# Patient Record
Sex: FEMALE | Race: WHITE | NOT HISPANIC OR LATINO | Employment: UNEMPLOYED | URBAN - NONMETROPOLITAN AREA
[De-identification: names, ages, dates, MRNs, and addresses within clinical notes are randomized per-mention and may not be internally consistent; named-entity substitution may affect disease eponyms.]

---

## 2018-03-29 ENCOUNTER — LAB (OUTPATIENT)
Dept: LAB | Facility: HOSPITAL | Age: 23
End: 2018-03-29
Attending: OBSTETRICS & GYNECOLOGY

## 2018-03-29 ENCOUNTER — TRANSCRIBE ORDERS (OUTPATIENT)
Dept: ADMINISTRATIVE | Facility: HOSPITAL | Age: 23
End: 2018-03-29

## 2018-03-29 DIAGNOSIS — N91.2 ABSENCE OF MENSTRUATION: ICD-10-CM

## 2018-03-29 DIAGNOSIS — N91.2 ABSENCE OF MENSTRUATION: Primary | ICD-10-CM

## 2018-03-29 LAB — HCG INTACT+B SERPL-ACNC: <2 MIU/ML (ref 0–5)

## 2018-03-29 PROCEDURE — 84702 CHORIONIC GONADOTROPIN TEST: CPT

## 2018-03-29 PROCEDURE — 36415 COLL VENOUS BLD VENIPUNCTURE: CPT

## 2024-07-01 ENCOUNTER — OFFICE VISIT (OUTPATIENT)
Dept: FAMILY MEDICINE CLINIC | Facility: CLINIC | Age: 29
End: 2024-07-01
Payer: COMMERCIAL

## 2024-07-01 VITALS
HEIGHT: 69 IN | TEMPERATURE: 97.3 F | BODY MASS INDEX: 30.96 KG/M2 | RESPIRATION RATE: 18 BRPM | DIASTOLIC BLOOD PRESSURE: 70 MMHG | OXYGEN SATURATION: 98 % | HEART RATE: 80 BPM | WEIGHT: 209 LBS | SYSTOLIC BLOOD PRESSURE: 136 MMHG

## 2024-07-01 DIAGNOSIS — M79.672 LEFT FOOT PAIN: ICD-10-CM

## 2024-07-01 DIAGNOSIS — N91.2 AMENORRHEA: ICD-10-CM

## 2024-07-01 DIAGNOSIS — R07.89 ANTERIOR CHEST WALL PAIN: Primary | ICD-10-CM

## 2024-07-01 LAB
B-HCG UR QL: NEGATIVE
EXPIRATION DATE: NORMAL
INTERNAL NEGATIVE CONTROL: NEGATIVE
INTERNAL POSITIVE CONTROL: POSITIVE
Lab: NORMAL

## 2024-07-01 PROCEDURE — 81025 URINE PREGNANCY TEST: CPT | Performed by: PSYCHOLOGIST

## 2024-07-01 PROCEDURE — 1160F RVW MEDS BY RX/DR IN RCRD: CPT | Performed by: PSYCHOLOGIST

## 2024-07-01 PROCEDURE — 99204 OFFICE O/P NEW MOD 45 MIN: CPT | Performed by: PSYCHOLOGIST

## 2024-07-01 PROCEDURE — 1159F MED LIST DOCD IN RCRD: CPT | Performed by: PSYCHOLOGIST

## 2024-07-01 RX ORDER — DICLOFENAC SODIUM 75 MG/1
75 TABLET, DELAYED RELEASE ORAL 2 TIMES DAILY WITH MEALS
Qty: 60 TABLET | Refills: 2 | Status: SHIPPED | OUTPATIENT
Start: 2024-07-01 | End: 2024-09-29

## 2024-07-01 NOTE — LETTER
July 1, 2024     Patient: Francheska Gold   YOB: 1995   Date of Visit: 7/1/2024       To Whom It May Concern:    It is my medical opinion that Francheska Gold may return to work on 7/3/24 .           Sincerely,        This document has been electronically signed by Miguel Pereira MD  July 1, 2024 17:33 EDT

## 2024-07-01 NOTE — LETTER
July 3, 2024     Patient: Francheska Gold   YOB: 1995   Date of Visit: 7/1/2024       To Whom It May Concern:    It is my medical opinion that Francheska Gold may return to light duty immediately with the following restrictions: no lifting, tugging, pushing more than 10 lbs.  . She is here today   to  this excuse.           Sincerely,        This document has been electronically signed by Miguel Pereira MD  July 3, 2024 09:38 EDT

## 2024-07-01 NOTE — PROGRESS NOTES
"Chief Complaint  Follow-up (Sternum pain x 1 week/Bilateral burning of the feet)    Patient is here for an urgent care/acute visit.  Patient has an established, non-[facility] Primary Care Provider.       Subjective          Francheska Gold presents to [facility]  As an urgent care visit / may want to establish care:    Chest Pain   This is a new problem. The current episode started 1 to 4 weeks ago. The problem occurs intermittently. The problem has been gradually worsening. The pain is present in the substernal region. The pain is at a severity of 7/10. The pain is moderate. The pain does not radiate. Pertinent negatives include no hemoptysis, irregular heartbeat, leg pain, numbness, palpitations, PND or shortness of breath.   Foot Injury   The incident occurred 5 to 7 days ago. The incident occurred at work. The injury mechanism is unknown. The pain is present in the right foot and left foot. The pain is at a severity of 5/10. The pain has been Fluctuating since onset. Associated symptoms include an inability to bear weight. Pertinent negatives include no loss of motion, loss of sensation, muscle weakness, numbness or tingling. The symptoms are aggravated by weight bearing and movement.     She reports hearing he \"sternum\" crack 3 times at work. She is lifting patients/ doing transfers/ and has been having  Pain in her chest wall.  Her left and right foot hurts too/ left more swollen than the right. Denies any fall.  Objective   Vital Signs:  /70 (BP Location: Right arm, Patient Position: Sitting, Cuff Size: Adult)   Pulse 80   Temp 97.3 °F (36.3 °C) (Temporal)   Resp 18   Ht 175.3 cm (69\")   Wt 94.8 kg (209 lb)   SpO2 98%   BMI 30.86 kg/m²     BMI: Body mass index is 30.86 kg/m².        Physical Exam  Vitals and nursing note reviewed.   Constitutional:       Appearance: Normal appearance. She is obese.   HENT:      Head: Normocephalic.      Right Ear: Tympanic membrane " normal.      Left Ear: Tympanic membrane normal.      Nose: Nose normal.      Mouth/Throat:      Mouth: Mucous membranes are moist.   Eyes:      Extraocular Movements: Extraocular movements intact.      Pupils: Pupils are equal, round, and reactive to light.   Cardiovascular:      Rate and Rhythm: Normal rate and regular rhythm.      Pulses: Normal pulses.      Heart sounds: Normal heart sounds.   Pulmonary:      Effort: Pulmonary effort is normal.      Breath sounds: Normal breath sounds.   Abdominal:      General: Abdomen is protuberant. Bowel sounds are normal.      Palpations: Abdomen is soft.   Musculoskeletal:         General: Normal range of motion.      Cervical back: Normal range of motion and neck supple.   Skin:     General: Skin is warm.      Capillary Refill: Capillary refill takes less than 2 seconds.   Neurological:      General: No focal deficit present.      Mental Status: She is alert and oriented to person, place, and time.   Psychiatric:         Mood and Affect: Mood normal.          Result Review :   The following data was reviewed by: Miguel Pereira MD on 07/01/2024:  NONE REVIEWED      CXR/LEFT FOOT PAIN  X-ray was performed this visit.  Indication: CHEST WALL PAIN / LEFT FOOT PAIN   Interpretation/Findings: NO ACUTE BONY ABNORMALITY   No comparison or previous X-ray was looked at today.         Assessment and Plan    Diagnoses and all orders for this visit:    1. Anterior chest wall pain (Primary)  -     XR Chest PA & Lateral    2. Left foot pain  Comments:  WEAR FOOT BRACE DURING THE DAY  Orders:  -     XR Foot 3+ View Left (In Office)    3. Amenorrhea  Comments:  She reports maybe pregnant / will need to chec pregancy test prior to XR  Orders:  -     POC Pregnancy, Urine    Other orders  -     diclofenac (VOLTAREN) 75 MG EC tablet; Take 1 tablet by mouth 2 (Two) Times a Day With Meals for 90 days.  Dispense: 60 tablet; Refill: 2         I spent 30 minutes caring for Francheska on this  date of service. This time includes time spent by me in the following activities:reviewing tests, obtaining and/or reviewing a separately obtained history, performing a medically appropriate examination and/or evaluation , counseling and educating the patient/family/caregiver, ordering medications, tests, or procedures, and documenting information in the medical record      Follow Up    Return if symptoms worsen or fail to improve/rtc./er.    Patient was given instructions and counseling regarding her condition or for health maintenance advice. Please see specific information pulled into the AVS if appropriate.           This document has been electronically signed by Miguel Pereira MD  July 3, 2024 09:38 EDT

## 2024-07-03 ENCOUNTER — PATIENT ROUNDING (BHMG ONLY) (OUTPATIENT)
Dept: FAMILY MEDICINE CLINIC | Facility: CLINIC | Age: 29
End: 2024-07-03
Payer: COMMERCIAL

## 2024-07-03 NOTE — PROGRESS NOTES
"July 3, 2024    Hello, may I speak with Francheska Gold?    My name is Hector Bustos    I am  with [facility]  [address]  [facility] [address] [phone].    Before we get started may I verify your date of birth? 1995    I am calling to officially welcome you to our practice and ask about your recent visit. Is this a good time to talk?     yes    Tell me about your visit with us. What things went well?      Patient stopped in today 7/3/24 and ask to speak with me regarding her visit with Dr. Pereira. Patient was very upset and started to cry while she was explaining her concerns from her visit on 7/1/24 with Dr. Pereira. She said that she told the person who was scheduling her appointment that she wanted to see Dr. Patrick. She was placed on Dr. Pereira's schedule by mistake. She said when Dr. Pereira started her visit she felt very rushed. She said Dr. Pereira said, \"all my staff is leaving\" and this upset the patient, The patient said she is having a tremendous amount of pain in her ankles. She said she felt like her concerns were not taken serious and she was told there was nothing wrong.She reproted the bedside manner was terrible.  I let the patient know I would discuss her concerns with Dr. Pereira. Patient will be seeing Dr. Patrick moving forward.           Thank you, and have a great day.      "

## 2024-07-09 ENCOUNTER — OFFICE VISIT (OUTPATIENT)
Dept: FAMILY MEDICINE CLINIC | Facility: CLINIC | Age: 29
End: 2024-07-09
Payer: COMMERCIAL

## 2024-07-09 VITALS
OXYGEN SATURATION: 97 % | RESPIRATION RATE: 18 BRPM | BODY MASS INDEX: 31.19 KG/M2 | WEIGHT: 210.6 LBS | TEMPERATURE: 96.8 F | HEART RATE: 78 BPM | DIASTOLIC BLOOD PRESSURE: 65 MMHG | SYSTOLIC BLOOD PRESSURE: 118 MMHG | HEIGHT: 69 IN

## 2024-07-09 DIAGNOSIS — M79.672 LEFT FOOT PAIN: Primary | ICD-10-CM

## 2024-07-09 PROCEDURE — 99213 OFFICE O/P EST LOW 20 MIN: CPT | Performed by: FAMILY MEDICINE

## 2024-07-09 NOTE — PROGRESS NOTES
"Chief Complaint  Follow-up (Bilateral ankle swelling/Chest pain)    Subjective        Francheska Gold presents to [facility]  History of Present Illness  The patient is a 28 y.o. female who is here today for follow up on her left foot pain. The patient states that the pain is still there and today her pain is still 7/10. The patient is also here because her work does not have light duty so she is not able to go to work.      Objective   Vital Signs:  /65 (BP Location: Left arm, Patient Position: Sitting, Cuff Size: Adult)   Pulse 78   Temp 96.8 °F (36 °C) (Temporal)   Resp 18   Ht 175.3 cm (69\")   Wt 95.5 kg (210 lb 9.6 oz)   SpO2 97%   BMI 31.10 kg/m²   Estimated body mass index is 31.1 kg/m² as calculated from the following:    Height as of this encounter: 175.3 cm (69\").    Weight as of this encounter: 95.5 kg (210 lb 9.6 oz).               Physical Exam  Vitals and nursing note reviewed.   Constitutional:       Appearance: Normal appearance.   HENT:      Head: Normocephalic and atraumatic.      Right Ear: Tympanic membrane and ear canal normal.      Left Ear: Tympanic membrane and ear canal normal.      Nose: Nose normal.      Mouth/Throat:      Mouth: Mucous membranes are moist.   Eyes:      Extraocular Movements: Extraocular movements intact.      Pupils: Pupils are equal, round, and reactive to light.   Cardiovascular:      Rate and Rhythm: Normal rate and regular rhythm. No extrasystoles are present.     Heart sounds: Normal heart sounds. No murmur heard.  Pulmonary:      Effort: Pulmonary effort is normal.      Breath sounds: Normal breath sounds. No decreased breath sounds, wheezing or rhonchi.   Abdominal:      Palpations: Abdomen is soft. There is no mass.      Tenderness: There is no abdominal tenderness. There is no right CVA tenderness, left CVA tenderness, guarding or rebound.   Musculoskeletal:         General: Normal range of motion.      Cervical back: " Normal range of motion and neck supple.      Right lower leg: No edema.      Left lower leg: No edema.   Feet:      Comments: Left foot: no bruise or swelling, still with tenderness on the lateral side of the foot all the way to the front of the lateral malleolus  Skin:     Findings: No rash.   Neurological:      General: No focal deficit present.      Mental Status: She is alert and oriented to person, place, and time.      Sensory: Sensation is intact.      Motor: Motor function is intact.      Coordination: Coordination is intact.      Gait: Gait is intact.      Deep Tendon Reflexes: Reflexes are normal and symmetric.   Psychiatric:         Attention and Perception: Attention and perception normal.         Mood and Affect: Mood normal.         Speech: Speech normal.         Behavior: Behavior normal. Behavior is cooperative.         Thought Content: Thought content normal.        Result Review :                     Assessment and Plan     Diagnoses and all orders for this visit:    1. Left foot pain (Primary)  Comments:  possible strain  Orders:  -     Ambulatory Referral to Physical Therapy           I spent 15 minutes caring for Francheska on this date of service. This time includes time spent by me in the following activities:preparing for the visit, performing a medically appropriate examination and/or evaluation , counseling and educating the patient/family/caregiver, ordering medications, tests, or procedures, and documenting information in the medical record  Follow Up     No follow-ups on file.  Patient was given instructions and counseling regarding her condition or for health maintenance advice. Please see specific information pulled into the AVS if appropriate.     RTC as needed if symptoms worsen or fail to improve.      This document has been electronically signed by Raudel Patrick MD  July 11, 2024 21:40 EDT

## 2024-07-09 NOTE — LETTER
July 9, 2024     Patient: Francheska Gold   YOB: 1995   Date of Visit: 7/9/2024       To Whom It May Concern:    It is my medical opinion that Francheska Gold should remain out of work until we see her for follow up on 7/23/24 .           Sincerely,        Raudel Patrick MD    CC: No Recipients

## 2024-09-04 ENCOUNTER — TELEPHONE (OUTPATIENT)
Dept: FAMILY MEDICINE CLINIC | Facility: CLINIC | Age: 29
End: 2024-09-04
Payer: COMMERCIAL

## 2024-09-05 DIAGNOSIS — M79.672 LEFT FOOT PAIN: Primary | ICD-10-CM
